# Patient Record
Sex: MALE | Race: WHITE
[De-identification: names, ages, dates, MRNs, and addresses within clinical notes are randomized per-mention and may not be internally consistent; named-entity substitution may affect disease eponyms.]

---

## 2017-01-23 ENCOUNTER — HOSPITAL ENCOUNTER (OUTPATIENT)
Dept: HOSPITAL 62 - END | Age: 57
Discharge: HOME | End: 2017-01-23
Attending: INTERNAL MEDICINE
Payer: MEDICAID

## 2017-01-23 VITALS — DIASTOLIC BLOOD PRESSURE: 73 MMHG | SYSTOLIC BLOOD PRESSURE: 120 MMHG

## 2017-01-23 DIAGNOSIS — Z86.14: ICD-10-CM

## 2017-01-23 DIAGNOSIS — Z79.899: ICD-10-CM

## 2017-01-23 DIAGNOSIS — Z79.51: ICD-10-CM

## 2017-01-23 DIAGNOSIS — R19.4: ICD-10-CM

## 2017-01-23 DIAGNOSIS — K92.1: Primary | ICD-10-CM

## 2017-01-23 DIAGNOSIS — K21.9: ICD-10-CM

## 2017-01-23 DIAGNOSIS — G62.9: ICD-10-CM

## 2017-01-23 DIAGNOSIS — R10.84: ICD-10-CM

## 2017-01-23 DIAGNOSIS — B19.20: ICD-10-CM

## 2017-01-23 DIAGNOSIS — I10: ICD-10-CM

## 2017-01-23 PROCEDURE — 88305 TISSUE EXAM BY PATHOLOGIST: CPT

## 2017-01-23 PROCEDURE — 0DBF8ZX EXCISION OF RIGHT LARGE INTESTINE, VIA NATURAL OR ARTIFICIAL OPENING ENDOSCOPIC, DIAGNOSTIC: ICD-10-PCS | Performed by: INTERNAL MEDICINE

## 2017-01-23 PROCEDURE — 45380 COLONOSCOPY AND BIOPSY: CPT

## 2017-01-23 RX ADMIN — SALINE LAXATIVE ONE ML: 7; 19 ENEMA RECTAL at 11:40

## 2017-01-23 RX ADMIN — SALINE LAXATIVE ONE ML: 7; 19 ENEMA RECTAL at 10:54

## 2017-01-23 NOTE — OPERATIVE REPORT
Operative Report


DATE OF SURGERY: 01/23/17


Operative Report: 


The risks, benefits and alternatives of the procedure including risks of 

bleeding, perforation requiring surgery are explained to the patient in detail 

and informed consent is obtained.  Patient is brought back to the endoscopy 

suite.  Patient is placed in a left lateral decubital position.  Timeout is 

called.  Propofol medication is administered.  A rectal examination was done 

which did not reveal any masses, tears or fissures.  An Olympus video scope was 

inserted into the patient's rectum.  Keeping the lumen in site at all times the 

scope was then gradually advanced all the way to the cecum.  The cecum as 

identified by the ileocecal valve.  Prep is better.  Irrigation had to be done.

  Several liters of stool had to be suctioned out.  The scope was then 

sequentially pulled back out via the various segments of the colon including 

the ascending colon, hepatic flexure, transverse colon, splenic flexure, 

descending colon and finally into the rectosigmoid portions of the colon.  

Retroflexion maneuvers performed.


PREOPERATIVE DIAGNOSIS: Patient complaining of diarrhea.  He does have chronic 

use of long-term narcotic medications.  Rule out colitis.


POSTOPERATIVE DIAGNOSIS: Likely had paradoxical diarrhea.  Biopsies obtained in 

the right side of the colon to rule out for collagenous, microcytic, 

lymphocytic colitis.


OPERATION: Colonoscopy with biopsy


SURGEON: HEIDI CHANCE


ANESTHESIA: LMAC


TISSUE REMOVED OR ALTERED: Right-sided colon biopsies obtained


COMPLICATIONS: 


None.


ESTIMATED BLOOD LOSS: none.


INTRAOPERATIVE FINDINGS: Prep is not adequate.  Large amounts of fluid had to 

be used to irrigate the colon.  No obstruction is noted.  Patient does have 

chronic oncotic pain use.  Likely having paradoxical diarrhea.  No other masses

, AVMs, diverticulosis noted.  Internal hemorrhoids are noted.


PROCEDURE: 


Patient tolerated the procedure well.


No immediate postprocedure complications are noted.


Patient is discharged in good condition.


Discharge date 01/23/2017.


Discharge diet: Regular.


Discharge activity: Regular.


Patient does have a 2-3 week follow-up to discuss findings.


We'll await on biopsies.


Patient is instructed to call the office or proceed to the emergency room 

should there be any further problems or questions.

## 2018-03-14 ENCOUNTER — HOSPITAL ENCOUNTER (OUTPATIENT)
Dept: HOSPITAL 62 - OD | Age: 58
End: 2018-03-14
Attending: FAMILY MEDICINE
Payer: MEDICARE

## 2018-03-14 DIAGNOSIS — M51.37: Primary | ICD-10-CM

## 2018-03-14 DIAGNOSIS — M54.6: ICD-10-CM

## 2018-03-14 PROCEDURE — 72070 X-RAY EXAM THORAC SPINE 2VWS: CPT

## 2018-03-14 PROCEDURE — 72110 X-RAY EXAM L-2 SPINE 4/>VWS: CPT

## 2018-03-14 NOTE — RADIOLOGY REPORT (SQ)
EXAM DESCRIPTION:  T SPINE AP/LAT



COMPLETED DATE/TIME:  3/14/2018 5:10 pm



REASON FOR STUDY:  PAIN IN THORACIC SPINE M51.37  OTHER INTERVERTEBRAL DISC DEGENERATION, LUMBOSACRAL
 R M54.6  PAIN IN THORACIC SPINE



COMPARISON:  None.



NUMBER OF VIEWS:  Two views.



TECHNIQUE:  AP and lateral radiographic images acquired of the thoracic spine.



LIMITATIONS:  None.



FINDINGS:  MINERALIZATION: Normal.

ALIGNMENT: Normal.  No scoliosis.

VERTEBRAE: No fracture or bone lesion.  Maintained height, normal segmentation.

DISCS: No significant loss of height or significant narrowing.  No large osteophytes.

HARDWARE: None in the spine.

MEDIASTINUM AND SOFT TISSUES: Normal heart size and aortic contour.  No soft tissue abnormality.

VISUALIZED LUNG FIELDS: Clear.

OTHER: No other significant finding.



IMPRESSION:  NO SIGNIFICANT RADIOGRAPHIC FINDING IN THE THORACIC SPINE.



TECHNICAL DOCUMENTATION:  JOB ID:  [id number]

 2011 [employer]- All Rights Reserved



Reading location - IP/workstation name: [email or web address]

## 2018-03-14 NOTE — RADIOLOGY REPORT (SQ)
EXAM DESCRIPTION:  LUMBAR SPINE COMPLETE



COMPLETED DATE/TIME:  3/14/2018 5:10 pm



REASON FOR STUDY:  OTHER INTERVERTEBRAL DISC DEGENERATION, LUMBOSACRAL REGION M51.37  OTHER INTERVERT
EBRAL DISC DEGENERATION, LUMBOSACRAL R M54.6  PAIN IN THORACIC SPINE



COMPARISON:  3/19/2012.



NUMBER OF VIEWS:  Five views including obliques.



TECHNIQUE:  AP, lateral, oblique, and sacral radiographic images acquired of the lumbar spine.



LIMITATIONS:  None.



FINDINGS:  MINERALIZATION: Normal.

SEGMENTATION: Normal.  No transitional anatomy.

ALIGNMENT: Normal.

VERTEBRAE: Maintained height.  No fracture or worrisome bone lesion.

DISCS: Multilevel disc space narrowing with osteophytes.

POSTERIOR ELEMENTS: Pedicles and facets are intact.  Previous laminectomy. Facet arthropathy is prese
nt.

HARDWARE: None in the spine.

PARASPINAL SOFT TISSUES: Normal.

PELVIS: Intact as visualized. No fractures or worrisome bone lesions. SI joints intact.

OTHER: No other significant finding.



IMPRESSION:  MULTILEVEL DEGENERATIVE CHANGES.  SURGICAL CHANGES.  NO ACUTE FINDINGS.



TECHNICAL DOCUMENTATION:  JOB ID:  [id number]

 2011 [employer]- All Rights Reserved



Reading location - IP/workstation name: [email or web address]

## 2018-08-18 ENCOUNTER — HOSPITAL ENCOUNTER (EMERGENCY)
Dept: HOSPITAL 62 - ER | Age: 58
Discharge: ANOTHER HOSPITAL | End: 2018-08-18
Payer: MEDICARE

## 2018-08-18 VITALS — SYSTOLIC BLOOD PRESSURE: 113 MMHG | DIASTOLIC BLOOD PRESSURE: 75 MMHG

## 2018-08-18 DIAGNOSIS — R19.7: ICD-10-CM

## 2018-08-18 DIAGNOSIS — Z88.8: ICD-10-CM

## 2018-08-18 DIAGNOSIS — A41.9: Primary | ICD-10-CM

## 2018-08-18 DIAGNOSIS — G89.29: ICD-10-CM

## 2018-08-18 DIAGNOSIS — K92.2: ICD-10-CM

## 2018-08-18 DIAGNOSIS — R74.8: ICD-10-CM

## 2018-08-18 DIAGNOSIS — F17.200: ICD-10-CM

## 2018-08-18 DIAGNOSIS — Z79.891: ICD-10-CM

## 2018-08-18 DIAGNOSIS — N18.3: ICD-10-CM

## 2018-08-18 DIAGNOSIS — I12.9: ICD-10-CM

## 2018-08-18 DIAGNOSIS — R11.2: ICD-10-CM

## 2018-08-18 DIAGNOSIS — Z79.899: ICD-10-CM

## 2018-08-18 DIAGNOSIS — K52.9: ICD-10-CM

## 2018-08-18 DIAGNOSIS — R07.9: ICD-10-CM

## 2018-08-18 DIAGNOSIS — N17.9: ICD-10-CM

## 2018-08-18 DIAGNOSIS — E87.5: ICD-10-CM

## 2018-08-18 LAB
ABSOLUTE LYMPHOCYTES# (MANUAL): 2.1 10^3/UL (ref 0.5–4.7)
ABSOLUTE MONOCYTES # (MANUAL): 1 10^3/UL (ref 0.1–1.4)
ABSOLUTE NEUTROPHILS# (MANUAL): 22.8 10^3/UL (ref 1.7–8.2)
ADD MANUAL DIFF: YES
ALBUMIN SERPL-MCNC: 3.4 G/DL (ref 3.5–5)
ALP SERPL-CCNC: 567 U/L (ref 38–126)
ALT SERPL-CCNC: 45 U/L (ref 21–72)
ANION GAP SERPL CALC-SCNC: 15 MMOL/L (ref 5–19)
APPEARANCE UR: (no result)
APTT PPP: (no result) S
AST SERPL-CCNC: 76 U/L (ref 17–59)
BARBITURATES UR QL SCN: NEGATIVE
BASE EXCESS BLDV CALC-SCNC: -7.9 MMOL/L
BASOPHILS NFR BLD MANUAL: 0 % (ref 0–2)
BILIRUB DIRECT SERPL-MCNC: 0.8 MG/DL (ref 0–0.4)
BILIRUB SERPL-MCNC: 0.8 MG/DL (ref 0.2–1.3)
BILIRUB UR QL STRIP: NEGATIVE
BUN SERPL-MCNC: 61 MG/DL (ref 7–20)
CALCIUM: 9 MG/DL (ref 8.4–10.2)
CHLORIDE SERPL-SCNC: 106 MMOL/L (ref 98–107)
CO2 SERPL-SCNC: 16 MMOL/L (ref 22–30)
CRP SERPL-MCNC: 54.1 MG/L (ref ?–10)
EOSINOPHIL NFR BLD MANUAL: 0 % (ref 0–6)
ERYTHROCYTE [DISTWIDTH] IN BLOOD BY AUTOMATED COUNT: 14.3 % (ref 11.5–14)
ERYTHROCYTE [SEDIMENTATION RATE] IN BLOOD: 9 MM/HR (ref 0–20)
GLUCOSE SERPL-MCNC: 121 MG/DL (ref 75–110)
GLUCOSE UR STRIP-MCNC: NEGATIVE MG/DL
HCO3 BLDV-SCNC: 18 MMOL/L (ref 20–32)
HCT VFR BLD CALC: 51.1 % (ref 37.9–51)
HGB BLD-MCNC: 17 G/DL (ref 13.5–17)
INR PPP: 1.11
KETONES UR STRIP-MCNC: NEGATIVE MG/DL
LIPASE SERPL-CCNC: 82.6 U/L (ref 23–300)
MCH RBC QN AUTO: 30.6 PG (ref 27–33.4)
MCHC RBC AUTO-ENTMCNC: 33.4 G/DL (ref 32–36)
MCV RBC AUTO: 92 FL (ref 80–97)
METHADONE UR QL SCN: NEGATIVE
MONOCYTES % (MANUAL): 4 % (ref 3–13)
NITRITE UR QL STRIP: NEGATIVE
PCO2 BLDV: 38.9 MMHG (ref 35–63)
PCP UR QL SCN: NEGATIVE
PH BLDV: 7.28 [PH] (ref 7.3–7.42)
PH UR STRIP: 5 [PH] (ref 5–9)
PLATELET # BLD: 165 10^3/UL (ref 150–450)
PLATELET COMMENT: ADEQUATE
POTASSIUM SERPL-SCNC: 5.8 MMOL/L (ref 3.6–5)
PROT SERPL-MCNC: 6.5 G/DL (ref 6.3–8.2)
PROT UR STRIP-MCNC: 30 MG/DL
PROTHROMBIN TIME: 14.9 SEC (ref 11.4–15.4)
RBC # BLD AUTO: 5.57 10^6/UL (ref 4.35–5.55)
RBC MORPH BLD: (no result)
SEGMENTED NEUTROPHILS % (MAN): 88 % (ref 42–78)
SODIUM SERPL-SCNC: 137.2 MMOL/L (ref 137–145)
SP GR UR STRIP: 1.01
TOTAL CELLS COUNTED BLD: 100
URINE AMPHETAMINES SCREEN: NEGATIVE
URINE BENZODIAZEPINES SCREEN: (no result)
URINE COCAINE SCREEN: NEGATIVE
URINE MARIJUANA (THC) SCREEN: NEGATIVE
UROBILINOGEN UR-MCNC: 2 MG/DL (ref ?–2)
VARIANT LYMPHS NFR BLD MANUAL: 7 % (ref 13–45)
WBC # BLD AUTO: 25.9 10^3/UL (ref 4–10.5)

## 2018-08-18 PROCEDURE — 84484 ASSAY OF TROPONIN QUANT: CPT

## 2018-08-18 PROCEDURE — 96375 TX/PRO/DX INJ NEW DRUG ADDON: CPT

## 2018-08-18 PROCEDURE — 94640 AIRWAY INHALATION TREATMENT: CPT

## 2018-08-18 PROCEDURE — 80053 COMPREHEN METABOLIC PANEL: CPT

## 2018-08-18 PROCEDURE — 87086 URINE CULTURE/COLONY COUNT: CPT

## 2018-08-18 PROCEDURE — 93005 ELECTROCARDIOGRAM TRACING: CPT

## 2018-08-18 PROCEDURE — 85610 PROTHROMBIN TIME: CPT

## 2018-08-18 PROCEDURE — 96361 HYDRATE IV INFUSION ADD-ON: CPT

## 2018-08-18 PROCEDURE — 96365 THER/PROPH/DIAG IV INF INIT: CPT

## 2018-08-18 PROCEDURE — 85025 COMPLETE CBC W/AUTO DIFF WBC: CPT

## 2018-08-18 PROCEDURE — 81001 URINALYSIS AUTO W/SCOPE: CPT

## 2018-08-18 PROCEDURE — 82803 BLOOD GASES ANY COMBINATION: CPT

## 2018-08-18 PROCEDURE — 99291 CRITICAL CARE FIRST HOUR: CPT

## 2018-08-18 PROCEDURE — 96367 TX/PROPH/DG ADDL SEQ IV INF: CPT

## 2018-08-18 PROCEDURE — 85652 RBC SED RATE AUTOMATED: CPT

## 2018-08-18 PROCEDURE — 76705 ECHO EXAM OF ABDOMEN: CPT

## 2018-08-18 PROCEDURE — 99292 CRITICAL CARE ADDL 30 MIN: CPT

## 2018-08-18 PROCEDURE — 93010 ELECTROCARDIOGRAM REPORT: CPT

## 2018-08-18 PROCEDURE — 87040 BLOOD CULTURE FOR BACTERIA: CPT

## 2018-08-18 PROCEDURE — 87493 C DIFF AMPLIFIED PROBE: CPT

## 2018-08-18 PROCEDURE — 80307 DRUG TEST PRSMV CHEM ANLYZR: CPT

## 2018-08-18 PROCEDURE — 74176 CT ABD & PELVIS W/O CONTRAST: CPT

## 2018-08-18 PROCEDURE — 36415 COLL VENOUS BLD VENIPUNCTURE: CPT

## 2018-08-18 PROCEDURE — 83690 ASSAY OF LIPASE: CPT

## 2018-08-18 PROCEDURE — 82272 OCCULT BLD FECES 1-3 TESTS: CPT

## 2018-08-18 PROCEDURE — 86140 C-REACTIVE PROTEIN: CPT

## 2018-08-18 PROCEDURE — 87088 URINE BACTERIA CULTURE: CPT

## 2018-08-18 PROCEDURE — 83605 ASSAY OF LACTIC ACID: CPT

## 2018-08-18 PROCEDURE — 71045 X-RAY EXAM CHEST 1 VIEW: CPT

## 2018-08-18 PROCEDURE — 94660 CPAP INITIATION&MGMT: CPT

## 2018-08-18 PROCEDURE — 87186 SC STD MICRODIL/AGAR DIL: CPT

## 2018-08-18 PROCEDURE — 82962 GLUCOSE BLOOD TEST: CPT

## 2018-08-18 RX ADMIN — SODIUM CHLORIDE PRN MLS/HR: 9 INJECTION, SOLUTION INTRAVENOUS at 15:10

## 2018-08-18 RX ADMIN — SODIUM CHLORIDE PRN MLS/HR: 9 INJECTION, SOLUTION INTRAVENOUS at 15:31

## 2018-08-18 NOTE — RADIOLOGY REPORT (SQ)
EXAM DESCRIPTION:  U/S ABDOMEN LIMITED W/O DOP



COMPLETED DATE/TIME:  8/18/2018 7:44 pm



REASON FOR STUDY:  elevated LFTs



COMPARISON:  CT abdomen and pelvis 8/18/2018



TECHNIQUE:  Dynamic and static grayscale images acquired of the abdomen and recorded on PACS. Rhodao
cyndi selected color Doppler and spectral images recorded.



LIMITATIONS:  The pancreas and aorta are not adequately visualized.



FINDINGS:  PANCREAS: Not visualized.

LIVER: No masses. Echotexture normal.

LIVER VASCULATURE: Normal directional flow of the main portal vein and hepatic veins.

GALLBLADDER: No stones. Normal wall thickness. No pericholecystic fluid.

ULTRASOUND-DETECTED ARAUZ'S SIGN: Not documented.

INTRAHEPATIC DUCTS AND COMMON DUCT: CBD and intrahepatic ducts normal caliber. No filling defects.

INFERIOR VENA CAVA: Normal flow.

AORTA: Not adequately evaluated.

RIGHT KIDNEY:  Normal size. Normal echogenicity. No solid or suspicious masses. No hydronephrosis. No
 calcifications.

PERITONEAL AND RIGHT PLEURAL SPACE: No ascites or effusions.

OTHER: No other significant findings.



IMPRESSION:  Limited examination.  The pancreas and aorta are not adequately evaluated.  Otherwise un
remarkable sonographic appearance of the right upper quadrant.



TECHNICAL DOCUMENTATION:  JOB ID:  [id number]

 2011 [employer]- All Rights Reserved



Reading location - IP/workstation name: [email or web address]

## 2018-08-18 NOTE — ER DOCUMENT REPORT
ED Medical Screen (RME)





- General


Stated Complaint: CHEST PAIN


Time Seen by Provider: 08/18/18 15:00





- HPI


Notes: 





08/18/18 15:13


Patient brought in by EMS for nausea vomiting diarrhea hours.  Patient states 

lower abdominal pain patient upon arrival here to the ER tachycardic 

hypotensive.





- Related Data


Allergies/Adverse Reactions: 


 





ketorolac [From Toradol] Adverse Reaction (Verified 07/29/17 22:53)


 











Review of Systems





- Review of Systems


Respiratory: Stridor


Gastrointestinal: Abdominal pain, Diarrhea, Nausea





Physical Exam





- Cardiovascular


Rhythm: Regular, Tachycardia


Heart sounds: Normal auscultation





- Abdominal


Tenderness: Other - Soft





Course





- Re-evaluation


Re-evalutation: 





08/18/18 15:14


EKG was ordered showing sinus tachycardia.  2 large-bore IVs have been 

established with fluid instilling patient also has an IO in the right lower 

extremity.  Patient on a nonrebreather.  Patient's saturations at this time 92%

.  Patient lungs are otherwise clear.  Concern for hypovolemia.  Also according 

to EMS initially concerned for a STEMI However EKGs were faxed to [facility] and reviewed by the STEMI team there deemed not a STEMI.  EKG at this time 

shows possible slight elevation in V6 however there is no signs of changes in 

contiguous leads.  EKG does show sinus tachycardia.  Because of low O2 sat on 

100% we will order BiPAP for the patient.  I discussed the initial presentation 

of the patient with colleague Elizabeth who will eval the pt





- Laboratory


Result Diagrams: 


 08/18/18 14:55





 08/18/18 14:55

## 2018-08-18 NOTE — RADIOLOGY REPORT (SQ)
EXAM DESCRIPTION:  CT ABD/PELVIS NO ORAL OR IV



COMPLETED DATE/TIME:  8/18/2018 6:04 pm



REASON FOR STUDY:  vomiting, diarrhea, bloody stool



COMPARISON:  None.



TECHNIQUE:  CT scan of the abdomen and pelvis performed without intravenous or oral contrast. Images 
reviewed with lung, soft tissue, and bone windows. Reconstructed coronal and sagittal MPR images revi
ewed. All images stored on PACS.

All CT scanners at this facility use dose modulation, iterative reconstruction, and/or weight based d
osing when appropriate to reduce radiation dose to as low as reasonably achievable (ALARA).

[facility]: Dose Right  [facility]: CareDose    [facility]: Dose Right    [facility]: Teradose 4D    [facility]: Smart [employer]



RADIATION DOSE:  CT Rad equipment meets quality standard of care and radiation dose reduction techniq
ues were employed. CTDIvol: 11.3 mGy. DLP: 643 mGy-cm.mGy.



LIMITATIONS:  The dome of the right lobe of the liver is not included in the imaged field of view.



FINDINGS:  LOWER CHEST: No significant findings. No nodules or infiltrates.

NON-CONTRASTED LIVER, SPLEEN, ADRENALS: Evaluation limited by lack of IV contrast and truncated field
 of view excluding the dome of the right hepatic lobe.  Incidental note is made of scattered hepatic 
cysts.  No identified significant masses.

PANCREAS: No masses. No peripancreatic inflammatory changes.

GALLBLADDER: Hydropic.  No identified stones by CT criteria. No inflammatory changes to suggest julio césar
cystitis.

RIGHT KIDNEY AND URETER: No suspicious masses. Assessment limited by lack of IV contrast.   No signif
icant calcifications.   No hydronephrosis or hydroureter.

LEFT KIDNEY AND URETER: No suspicious masses. Assessment limited by lack of IV contrast.   No signifi
cant calcifications.   No hydronephrosis or hydroureter.

AORTA AND RETROPERITONEUM: No aneurysm. No retroperitoneal masses or adenopathy.

BOWEL AND PERITONEAL CAVITY: Circumferential mural thickening is seen of the transverse and descendin
g colon with adjacent mesenteric fat stranding.  The remaining bowel appears grossly unremarkable.

APPENDIX: Not visualized.

PELVIS, BLADDER, AND ABDOMINAL WALL:No abnormal masses. No free fluid. Bladder normal.

BONES: No significant findings.

OTHER: Severe degenerative changes are seen of the hips and spine.



IMPRESSION:  Inflammatory changes involving the transverse and descending colon, likely on the basis 
of long segment colitis.



COMMENT:  Quality ID # 436: Final reports with documentation of one or more dose reduction techniques
 (e.g., Automated exposure control, adjustment of the mA and/or kV according to patient size, use of 
iterative reconstruction technique)



TECHNICAL DOCUMENTATION:  JOB ID:  [id number]

 2011 [employer]- All Rights Reserved



Reading location - IP/workstation name: [email or web address]

## 2018-08-18 NOTE — EKG REPORT
SEVERITY:- BORDERLINE ECG -

SINUS TACHYCARDIA

LOW VOLTAGE WITH RIGHT AXIS DEVIATION

BORDERLINE ST ELEVATION, INFERIOR LEADS

:

Confirmed by: Luis M Connors MD 18-Aug-2018 17:05:35

## 2018-08-18 NOTE — RADIOLOGY REPORT (SQ)
EXAM DESCRIPTION:  CHEST SINGLE VIEW



COMPLETED DATE/TIME:  8/18/2018 3:46 pm



REASON FOR STUDY:  tachy hypotensive



COMPARISON:  None.



EXAM PARAMETERS:  NUMBER OF VIEWS: One view.

TECHNIQUE: Single frontal radiographic view of the chest acquired.

RADIATION DOSE: NA

LIMITATIONS: None.



FINDINGS:  LUNGS AND PLEURA: No focal consolidation, masses or pneumothorax.  Incidental note is made
 of a partially calcified granuloma at the left lung base.  No pleural effusion.

MEDIASTINUM AND HILAR STRUCTURES: No masses.  Contour normal.

HEART AND VASCULAR STRUCTURES: Heart normal in size.  Normal vasculature.

BONES: No acute findings.

HARDWARE: None in the chest.

OTHER: No other significant finding.



IMPRESSION:  NO ACUTE RADIOGRAPHIC FINDING IN THE CHEST.



TECHNICAL DOCUMENTATION:  JOB ID:  [id number]

 2011 [employer]- All Rights Reserved



Reading location - IP/workstation name: [email or web address]

## 2018-08-18 NOTE — EKG REPORT
SEVERITY:- ABNORMAL ECG -

SINUS TACHYCARDIA

LOW VOLTAGE WITH RIGHT AXIS DEVIATION

ST ELEVATION SUGGESTS PERICARDITIS

:

Confirmed by: Luis M Connors MD 18-Aug-2018 17:06:40

## 2018-08-18 NOTE — ER DOCUMENT REPORT
ED General





- General


Mode of Arrival: Medic


Information source: Patient





<BILL PAGE - Last Filed: 08/18/18 21:30>





<ELIZABETHSOHEILA - Last Filed: 08/18/18 21:32>





- General


Chief Complaint: Nausea/Vomiting/Diarrhea


Stated Complaint: CHEST PAIN


Time Seen by Provider: 08/18/18 15:00


Notes: 


Patient is a 58 year old male on chronic pain management presents to the 

emergency department via EMS complaining of nausea and vomiting for 2-3 days 

with an associated symptom of lower abdominal pain. Patient states he was 

recently diagnosed with bronchitis and subsequently prescribed 2 antibiotics 

and steroids. He states he presented to the emergency department today when he 

began to have difficulty breathing and diffuse chest pain. He states he has had 

some blood in his stool further stating he was previously constipated and 

believes the bleeding is from tearing with exertion during his bowel movements. 

He denies any hematemesis. 





Patient presented to the emergency department hypotensive, hypoxic and 

tachycardic with an IO in the right lower extremity. EMS was initally concerned 

for a STEMI although EKG were faxed to [facility] and reviewed by a STEMI 

team who determined the patient was not a STEMI. 











 (BILL PAGE)





- Related Data


Allergies/Adverse Reactions: 


 





ketorolac [From Toradol] Adverse Reaction (Verified 07/29/17 22:53)


 











Past Medical History





- General


Information source: Patient





- Social History


Smoking Status: Current Every Day Smoker


Chew tobacco use (# tins/day): No


Frequency of alcohol use: Heavy


Drug Abuse: None


Family History: Reviewed & Not Pertinent


Patient has suicidal ideation: No


Patient has homicidal ideation: No





- Past Medical History


Cardiac Medical History: Reports: Hx Hypertension


Past Surgical History: Reports: Hx Orthopedic Surgery





<BILL PAGE - Last Filed: 08/18/18 21:30>





Review of Systems





- Review of Systems


Constitutional: No symptoms reported


EENT: No symptoms reported


Cardiovascular: See HPI, Chest pain


Respiratory: See HPI


Gastrointestinal: See HPI, Diarrhea, Nausea, Vomiting


Genitourinary: No symptoms reported


Male Genitourinary: No symptoms reported


Musculoskeletal: No symptoms reported


Skin: No symptoms reported


Hematologic/Lymphatic: No symptoms reported


Neurological/Psychological: No symptoms reported


-: Yes All other systems reviewed and negative





<BILL PAGE - Last Filed: 08/18/18 21:30>





Physical Exam





<BILL PAGE - Last Filed: 08/18/18 21:30>





<SOHEILA HENSON - Last Filed: 08/18/18 21:32>





- Vital signs


Vitals: 


 











Resp BP Pulse Ox


 


 33 H  83/57 L  92 


 


 08/18/18 14:55  08/18/18 14:55  08/18/18 14:55














- Notes


Notes: 


GENERAL: Awake, appears uncomfortable, ill appearing. No acute distress.


HEAD: Normocephalic, atraumatic.


EYES: Pupils equal, round, and reactive to light. Extraocular movements intact.


ENT: Oral mucosa dry, tongue midline.


NECK: Full range of motion. Supple. Trachea midline.


LUNGS: Rhonchi bilaterally, on a non-rebreather at bedside.


HEART: Tachycardic. No murmurs, gallops, or rubs.


ABDOMEN: Soft, tender to palpation to RUQ, LUQ, RLL, LLQ, non tender to the 

epigastrium. Non-distended. Bowel sounds present in all 4 quadrants.


EXTREMITIES: Moves all 4 extremities spontaneously. No edema, radial pulses 2/4 

bilaterally,decreased dorsalis pedis bilaterally . No cyanosis.


NEUROLOGICAL: Alert and oriented x3. Normal speech. 


PSYCH: Normal affect, normal mood.


SKIN: Core hot touch, extremities cool to touch, dry, normal turgor. No rashes 

or lesions noted.





 (BILL PAGE)





Course





- Laboratory


Result Diagrams: 


 08/18/18 17:00





 08/18/18 17:00





<BILL PAGE - Last Filed: 08/18/18 21:30>





- Laboratory


Result Diagrams: 


 08/18/18 17:00





 08/18/18 17:00





<SOHEILA HENSON - Last Filed: 08/18/18 21:32>





- Re-evaluation


Re-evalutation: 





08/18/18 21:23


Patient initially showed up was hypotensive and tachycardic but afebrile.  He 

was also tachypneic and hypoxic.  Full septic workup was initiated, patient was 

placed on BiPAP and his hypoxia responded well to this.  Venous blood gas 

showed acidosis with pH of 7.28, no evidence of CO2 retention, he was given an 

albuterol treatment and he is now breathing much more easily, patient was able 

to be taken off of the BiPAP and is maintaining his saturations at 95% on 4 L 

via nasal cannula.  When removed from BiPAP he became less hypotensive.  

Patient was given a 3 L bolus of crystalloids, initially given 2 L normal 

saline and then a 3 L of lactated Ringer's.  Patient is now receiving lactated 

Ringer's at 250 mL's per hour.  Lactic acid markedly elevated at 5.1, Zosyn 

started for sepsis, patient ended up having a grossly bloody bowel movement here

, this is different than his initial report however he then told the nurse that 

he had been having grossly bloody diarrhea.  CBC shows leukocytosis of 25.9, 

there is no anemia, hemoglobin 17, platelets normal, INR slightly prolonged at 

1.11, chemistries show acute on chronic renal failure with a BUN of 61 and a 

creatinine of 3.1, potassium elevated at 5.8, this is treated with albuterol, 

calcium gluconate and bicarbonate as well as hydration, AST was elevated 76, 

alkaline phosphatase elevated at 567, troponin elevated at 5.82.  ESR and CRP 

had been ordered because initial EKG raised the possibility of pericarditis 

however I think this is motion artifact mostly, ESR is normal, CRP is elevated 

at 54.1, lipase normal, urinalysis also so signs of blood and infection, there 

is large leukocyte esterase and 2+ bacteria.  Urine drug screen shows opiates 

and benzos both which she is prescribed.  Repeat lactic acid is improving at 2.5

, C. difficile PCR is pending.  Chest x-ray shows no acute process.  CT scan of 

the abdomen and pelvis reveals circumferential mural thickening of the 

transverse and descending colon with adjacent mesenteric fat stranding, 

appendix is not visualized, gallbladder is hydropic.  Flagyl was added based 

off of the findings of colitis, I did then go ahead and discussed this patient 

with the internist Dr. Bernstein from [facility], 

based off of the LFTs she requested that we add a right upper quadrant 

ultrasound.  This did not show any signs of cholecystitis or obstruction.





Patient has been rechecked multiple times approximately every hour and he is 

improving.  See sepsis recheck note for exact details.





Dr. Lawson Meeks did accept the patient to her service at [facility].  Patient is being transferred because he has an ongoing lower 

GI bleed and we do not have GI on call, he has an elevated troponin consistent 

with a non-STEMI and we will not have access to cardiac catheterization for a 

another 3 days, he is acute renal failure we do not have nephrology on-call.


08/18/18 21:32


Small dose of Ativan given due to his history of heavy alcohol use and the fact 

that he has been having vomiting for several days, some of his tachycardia may 

be coming from withdrawal as it is persistent despite normalization of his 

blood pressure.  Aspirin was withheld as he is having a GI bleed and is 

allergic to ketorolac. (SOHEILA HENSON)





- Vital Signs


Vital signs: 


 











Temp Pulse Resp BP Pulse Ox


 


 98.5 F      22 H  106/94 H  96 


 


 08/18/18 15:44     08/18/18 21:11  08/18/18 21:11  08/18/18 21:11














- Laboratory


Laboratory results interpreted by me: 


 











  08/18/18 08/18/18 08/18/18





  14:55 14:55 15:33


 


WBC   


 


RBC   


 


Hct   


 


RDW   


 


Seg Neuts % (Manual)   


 


Lymphocytes % (Manual)   


 


Abs Neuts (Manual)   


 


VBG pH   7.28 L 


 


VBG HCO3   18.0 L 


 


Potassium   


 


Carbon Dioxide   


 


BUN   


 


Creatinine   


 


Est GFR ( Amer)   


 


Est GFR (Non-Af Amer)   


 


Glucose   


 


POC Glucose    137 H


 


Lactic Acid  5.1 H  


 


Direct Bilirubin   


 


AST   


 


Alkaline Phosphatase   


 


C-Reactive Protein   


 


Albumin   


 


Urine Protein   


 


Urine Blood   


 


Urine Urobilinogen   


 


Ur Leukocyte Esterase   














  08/18/18 08/18/18 08/18/18





  17:00 17:00 17:28


 


WBC  25.9 H  


 


RBC  5.57 H  


 


Hct  51.1 H  


 


RDW  14.3 H  


 


Seg Neuts % (Manual)  88 H  


 


Lymphocytes % (Manual)  7 L  


 


Abs Neuts (Manual)  22.8 H  


 


VBG pH   


 


VBG HCO3   


 


Potassium   5.8 H 


 


Carbon Dioxide   16 L 


 


BUN   61 H 


 


Creatinine   3.10 H 


 


Est GFR ( Amer)   25 L 


 


Est GFR (Non-Af Amer)   21 L 


 


Glucose   121 H 


 


POC Glucose   


 


Lactic Acid   


 


Direct Bilirubin   0.8 H 


 


AST   76 H 


 


Alkaline Phosphatase   567 H 


 


C-Reactive Protein   54.1 H 


 


Albumin   3.4 L 


 


Urine Protein    30 H


 


Urine Blood    MODERATE H


 


Urine Urobilinogen    2.0 H


 


Ur Leukocyte Esterase    LARGE H














  08/18/18





  18:55


 


WBC 


 


RBC 


 


Hct 


 


RDW 


 


Seg Neuts % (Manual) 


 


Lymphocytes % (Manual) 


 


Abs Neuts (Manual) 


 


VBG pH 


 


VBG HCO3 


 


Potassium 


 


Carbon Dioxide 


 


BUN 


 


Creatinine 


 


Est GFR ( Amer) 


 


Est GFR (Non-Af Amer) 


 


Glucose 


 


POC Glucose 


 


Lactic Acid  2.5 H


 


Direct Bilirubin 


 


AST 


 


Alkaline Phosphatase 


 


C-Reactive Protein 


 


Albumin 


 


Urine Protein 


 


Urine Blood 


 


Urine Urobilinogen 


 


Ur Leukocyte Esterase 














- EKG Interpretation by Me


Additional EKG results interpreted by me: 





08/18/18 21:27


Initial EKG shows sinus tachycardia at a rate of 139, right axis deviation, 

poor R-wave progression, significant baseline artifact, difficult to determine 

ST segment elevation although there is no ST segment depression and there are 

no reciprocal changes.  Possible ST segment elevation in V6 but again so much 

baseline artifact but I cannot tell, similarly there may be some ST segment 

elevation in lead II but there is none in lead III or aVF per my interpretation.

 (SOHEILA HENSON)





Critical Care Note





- Critical Care Note


Total time excluding time spent on procedures (mins): 80





<SOHEILA HENSON - Last Filed: 08/18/18 21:32>





Discharge





<BILL PAGE - Last Filed: 08/18/18 21:30>





<SOHEILA HENSON - Last Filed: 08/18/18 21:32>





- Discharge


Clinical Impression: 


 Hyperkalemia, Lower GI bleed, Colitis





Sepsis


Qualifiers:


 Sepsis type: sepsis due to unspecified organism Qualified Code(s): A41.9 - 

Sepsis, unspecified organism





Acute on chronic renal failure


Qualifiers:


 Acute renal failure type: unspecified Chronic kidney disease stage: stage 3 (

moderate) Qualified Code(s): N17.9 - Acute kidney failure, unspecified





Condition: Critical


Disposition: Critical access hospital


Scribe Attestation: 





08/18/18 21:32


I personally performed the services described in the documentation, reviewed 

and edited the documentation which was dictated to the scribe in my presence, 

and it accurately records my words and actions. (SOHEILA HENSON)





Scribe Documentation





- Scribe


Written by Rin:: Rin Nolasco, 8/18/2018 16:26


acting as scribe for :: Elizabeth





<BILL PAGE - Last Filed: 08/18/18 21:30>





Sepsis





<BILL PAGE - Last Filed: 08/18/18 21:30>





- Sepsis Documentation


Sepsis Patient: Yes





- Vital Signs


Interpretation: Hypotensive, Tachycardic





- Cardiovascular


Peripheral Pulse Strength: Normal


Capillary refill: > 3 seconds


Rhythm: Regular, Tachycardia


Heart Sounds: Normal auscultation





- Respiratory


Breath Sounds: Clear


Respiratory Status: Tachypnea





- Skin


Skin Color: Pale





<SOHEILA HENSON - Last Filed: 08/18/18 21:32>





- Vital Signs


Vitals: 


 











Temp Pulse Resp BP Pulse Ox


 


 98.5 F      22 H  106/94 H  96 


 


 08/18/18 15:44     08/18/18 21:11  08/18/18 21:11  08/18/18 21:11











On recheck at 1544 temperature is 98.5, heart rate is 121, blood pressure was 91

/67, respiratory rate was 17, oxygenation was 100% on nonrebreather (SOHEILA HENSON)

## 2018-10-03 ENCOUNTER — HOSPITAL ENCOUNTER (OUTPATIENT)
Dept: HOSPITAL 62 - OROUT | Age: 58
Discharge: HOME | End: 2018-10-03
Attending: INTERNAL MEDICINE
Payer: MEDICARE

## 2018-10-03 VITALS — DIASTOLIC BLOOD PRESSURE: 81 MMHG | SYSTOLIC BLOOD PRESSURE: 122 MMHG

## 2018-10-03 DIAGNOSIS — F17.210: ICD-10-CM

## 2018-10-03 DIAGNOSIS — R06.02: ICD-10-CM

## 2018-10-03 DIAGNOSIS — K62.5: ICD-10-CM

## 2018-10-03 DIAGNOSIS — Z88.8: ICD-10-CM

## 2018-10-03 DIAGNOSIS — K29.50: ICD-10-CM

## 2018-10-03 DIAGNOSIS — I05.0: ICD-10-CM

## 2018-10-03 DIAGNOSIS — K92.0: Primary | ICD-10-CM

## 2018-10-03 DIAGNOSIS — I10: ICD-10-CM

## 2018-10-03 PROCEDURE — 45378 DIAGNOSTIC COLONOSCOPY: CPT

## 2018-10-03 PROCEDURE — 88305 TISSUE EXAM BY PATHOLOGIST: CPT

## 2018-10-03 PROCEDURE — 88342 IMHCHEM/IMCYTCHM 1ST ANTB: CPT

## 2018-10-03 PROCEDURE — 36415 COLL VENOUS BLD VENIPUNCTURE: CPT

## 2018-10-03 PROCEDURE — G0121 COLON CA SCRN NOT HI RSK IND: HCPCS

## 2018-10-03 PROCEDURE — 43239 EGD BIOPSY SINGLE/MULTIPLE: CPT

## 2018-10-03 PROCEDURE — 84132 ASSAY OF SERUM POTASSIUM: CPT

## 2018-10-03 NOTE — OPERATIVE REPORT
Operative Report


DATE OF SURGERY: 10/03/18


Operative Report: 





The risks, benefits and alternatives of the procedure including the risks of 

bleeding, perforation requiring surgery are explained to the patient in detail 

and informed consent is obtained.  Patient is brought back to the operating 

room and placed in the left, lateral decubital position.  An Olympus videoscope 

was introduced into the patient's mouth.  The esophagus is identified and 

ablated and insufflated.  The scope was then gradually advanced all which was 

the distal esophagus.  There does appear to be a previous old scar suggestive 

of a previous Salina-Hobson tear.  The scope is then advanced through the 

stomach mild gastritis is noted.  First and second portions of the duodenum 

were normal.


Following this the patient structures done around and colonoscopy attempted.  

When the scope was introduced into the patient's rectum there was solid stool.  

No good visualization was able to be obtained.  I aborted the procedure at risk 

of perforation.  Patient will need to be reprepped and rescheduled for 

colonoscopy.


PREOPERATIVE DIAGNOSIS: Blood in stool, diarrhea, change of bowel habits.  

Hematemesis


POSTOPERATIVE DIAGNOSIS: Gastritis status post biopsy.  Flexible sigmoidoscopy

diagnostic, incomplete colonoscopy due to the prep.


OPERATION: Diagnostic flex sig.  EGD with biopsy


SURGEON: HEIDI CHANCE


ANESTHESIA: LMAC


TISSUE REMOVED OR ALTERED: As noted above.


COMPLICATIONS: 





None.


ESTIMATED BLOOD LOSS: None.


INTRAOPERATIVE FINDINGS: As noted above.


PROCEDURE: 





Patient tolerated the procedure well.


No immediate postprocedure complications are noted.


Patient to be discharged in good condition.


Discharge date 10/3/2018.


Discharge diet: Regular.


Discharge activity: Regular.


Not able to complete the colonoscopy due to inadequate prep.  Proceeding with 

the procedure with the put the patient at high risk of potential perforation 

and therefore it was aborted for patient safety.


He will need to go ahead and reprep


We will go ahead and reschedule him for another location


Wait on gastric biopsies


He appears to have had at least a Salina-Hobson tear in the past


This is almost healed


No blood noted in the last 30 cm of the colon.

## 2019-01-18 ENCOUNTER — HOSPITAL ENCOUNTER (OUTPATIENT)
Dept: HOSPITAL 62 - OD | Age: 59
End: 2019-01-18
Attending: FAMILY MEDICINE
Payer: MEDICARE

## 2019-01-18 DIAGNOSIS — F51.01: ICD-10-CM

## 2019-01-18 DIAGNOSIS — F41.1: Primary | ICD-10-CM

## 2019-01-18 DIAGNOSIS — M47.814: ICD-10-CM

## 2019-01-18 DIAGNOSIS — M51.37: ICD-10-CM

## 2019-01-18 DIAGNOSIS — I10: ICD-10-CM

## 2019-01-18 DIAGNOSIS — M47.812: ICD-10-CM

## 2019-01-18 LAB
ADD MANUAL DIFF: NO
ALBUMIN SERPL-MCNC: 4.8 G/DL (ref 3.5–5)
ALP SERPL-CCNC: 100 U/L (ref 38–126)
ALT SERPL-CCNC: 46 U/L (ref 21–72)
ANION GAP SERPL CALC-SCNC: 11 MMOL/L (ref 5–19)
AST SERPL-CCNC: 41 U/L (ref 17–59)
BASOPHILS # BLD AUTO: 0 10^3/UL (ref 0–0.2)
BASOPHILS NFR BLD AUTO: 0.3 % (ref 0–2)
BILIRUB DIRECT SERPL-MCNC: 0.2 MG/DL (ref 0–0.4)
BILIRUB SERPL-MCNC: 0.4 MG/DL (ref 0.2–1.3)
BUN SERPL-MCNC: 43 MG/DL (ref 7–20)
CALCIUM: 10.6 MG/DL (ref 8.4–10.2)
CHLORIDE SERPL-SCNC: 105 MMOL/L (ref 98–107)
CO2 SERPL-SCNC: 25 MMOL/L (ref 22–30)
EOSINOPHIL # BLD AUTO: 0 10^3/UL (ref 0–0.6)
EOSINOPHIL NFR BLD AUTO: 0 % (ref 0–6)
ERYTHROCYTE [DISTWIDTH] IN BLOOD BY AUTOMATED COUNT: 14.1 % (ref 11.5–14)
GLUCOSE SERPL-MCNC: 105 MG/DL (ref 75–110)
HCT VFR BLD CALC: 43.3 % (ref 37.9–51)
HGB BLD-MCNC: 14.9 G/DL (ref 13.5–17)
LYMPHOCYTES # BLD AUTO: 2.7 10^3/UL (ref 0.5–4.7)
LYMPHOCYTES NFR BLD AUTO: 25.2 % (ref 13–45)
MCH RBC QN AUTO: 29.8 PG (ref 27–33.4)
MCHC RBC AUTO-ENTMCNC: 34.3 G/DL (ref 32–36)
MCV RBC AUTO: 87 FL (ref 80–97)
MONOCYTES # BLD AUTO: 0.5 10^3/UL (ref 0.1–1.4)
MONOCYTES NFR BLD AUTO: 5.1 % (ref 3–13)
NEUTROPHILS # BLD AUTO: 7.4 10^3/UL (ref 1.7–8.2)
NEUTS SEG NFR BLD AUTO: 69.4 % (ref 42–78)
PLATELET # BLD: 263 10^3/UL (ref 150–450)
POTASSIUM SERPL-SCNC: 5.5 MMOL/L (ref 3.6–5)
PROT SERPL-MCNC: 8.1 G/DL (ref 6.3–8.2)
RBC # BLD AUTO: 4.98 10^6/UL (ref 4.35–5.55)
SODIUM SERPL-SCNC: 140.6 MMOL/L (ref 137–145)
TOTAL CELLS COUNTED % (AUTO): 100 %
WBC # BLD AUTO: 10.7 10^3/UL (ref 4–10.5)

## 2019-01-18 PROCEDURE — 80053 COMPREHEN METABOLIC PANEL: CPT

## 2019-01-18 PROCEDURE — 36415 COLL VENOUS BLD VENIPUNCTURE: CPT

## 2019-01-18 PROCEDURE — 85025 COMPLETE CBC W/AUTO DIFF WBC: CPT

## 2019-08-04 ENCOUNTER — HOSPITAL ENCOUNTER (EMERGENCY)
Dept: HOSPITAL 62 - ER | Age: 59
Discharge: HOME | End: 2019-08-04
Payer: MEDICARE

## 2019-08-04 VITALS — DIASTOLIC BLOOD PRESSURE: 82 MMHG | SYSTOLIC BLOOD PRESSURE: 124 MMHG

## 2019-08-04 DIAGNOSIS — I10: ICD-10-CM

## 2019-08-04 DIAGNOSIS — Z88.8: ICD-10-CM

## 2019-08-04 DIAGNOSIS — M79.10: ICD-10-CM

## 2019-08-04 DIAGNOSIS — F17.210: ICD-10-CM

## 2019-08-04 DIAGNOSIS — N30.00: Primary | ICD-10-CM

## 2019-08-04 DIAGNOSIS — G89.29: ICD-10-CM

## 2019-08-04 DIAGNOSIS — J44.9: ICD-10-CM

## 2019-08-04 DIAGNOSIS — Z79.899: ICD-10-CM

## 2019-08-04 DIAGNOSIS — R53.1: ICD-10-CM

## 2019-08-04 LAB
ADD MANUAL DIFF: NO
ALBUMIN SERPL-MCNC: 4.8 G/DL (ref 3.5–5)
ALP SERPL-CCNC: 106 U/L (ref 38–126)
ANION GAP SERPL CALC-SCNC: 12 MMOL/L (ref 5–19)
APPEARANCE UR: (no result)
APTT PPP: YELLOW S
AST SERPL-CCNC: 23 U/L (ref 17–59)
BASOPHILS # BLD AUTO: 0.1 10^3/UL (ref 0–0.2)
BASOPHILS NFR BLD AUTO: 0.7 % (ref 0–2)
BILIRUB DIRECT SERPL-MCNC: 0.6 MG/DL (ref 0–0.4)
BILIRUB SERPL-MCNC: 0.6 MG/DL (ref 0.2–1.3)
BILIRUB UR QL STRIP: NEGATIVE
BUN SERPL-MCNC: 26 MG/DL (ref 7–20)
CALCIUM: 10.3 MG/DL (ref 8.4–10.2)
CHLORIDE SERPL-SCNC: 108 MMOL/L (ref 98–107)
CO2 SERPL-SCNC: 24 MMOL/L (ref 22–30)
EOSINOPHIL # BLD AUTO: 0 10^3/UL (ref 0–0.6)
EOSINOPHIL NFR BLD AUTO: 0.1 % (ref 0–6)
ERYTHROCYTE [DISTWIDTH] IN BLOOD BY AUTOMATED COUNT: 14.6 % (ref 11.5–14)
GLUCOSE SERPL-MCNC: 90 MG/DL (ref 75–110)
GLUCOSE UR STRIP-MCNC: NEGATIVE MG/DL
HCT VFR BLD CALC: 42.6 % (ref 37.9–51)
HGB BLD-MCNC: 14.3 G/DL (ref 13.5–17)
KETONES UR STRIP-MCNC: NEGATIVE MG/DL
LYMPHOCYTES # BLD AUTO: 2.3 10^3/UL (ref 0.5–4.7)
LYMPHOCYTES NFR BLD AUTO: 22.1 % (ref 13–45)
MCH RBC QN AUTO: 29.9 PG (ref 27–33.4)
MCHC RBC AUTO-ENTMCNC: 33.6 G/DL (ref 32–36)
MCV RBC AUTO: 89 FL (ref 80–97)
MONOCYTES # BLD AUTO: 0.5 10^3/UL (ref 0.1–1.4)
MONOCYTES NFR BLD AUTO: 4.7 % (ref 3–13)
NEUTROPHILS # BLD AUTO: 7.4 10^3/UL (ref 1.7–8.2)
NEUTS SEG NFR BLD AUTO: 72.4 % (ref 42–78)
NITRITE UR QL STRIP: NEGATIVE
PH UR STRIP: 5 [PH] (ref 5–9)
PLATELET # BLD: 247 10^3/UL (ref 150–450)
POTASSIUM SERPL-SCNC: 5.6 MMOL/L (ref 3.6–5)
PROT SERPL-MCNC: 8.3 G/DL (ref 6.3–8.2)
PROT UR STRIP-MCNC: 30 MG/DL
RBC # BLD AUTO: 4.78 10^6/UL (ref 4.35–5.55)
SP GR UR STRIP: 1.02
TOTAL CELLS COUNTED % (AUTO): 100 %
UROBILINOGEN UR-MCNC: NEGATIVE MG/DL (ref ?–2)
WBC # BLD AUTO: 10.2 10^3/UL (ref 4–10.5)

## 2019-08-04 PROCEDURE — 83735 ASSAY OF MAGNESIUM: CPT

## 2019-08-04 PROCEDURE — 93010 ELECTROCARDIOGRAM REPORT: CPT

## 2019-08-04 PROCEDURE — 85025 COMPLETE CBC W/AUTO DIFF WBC: CPT

## 2019-08-04 PROCEDURE — 99284 EMERGENCY DEPT VISIT MOD MDM: CPT

## 2019-08-04 PROCEDURE — 81001 URINALYSIS AUTO W/SCOPE: CPT

## 2019-08-04 PROCEDURE — 71045 X-RAY EXAM CHEST 1 VIEW: CPT

## 2019-08-04 PROCEDURE — 93005 ELECTROCARDIOGRAM TRACING: CPT

## 2019-08-04 PROCEDURE — 80053 COMPREHEN METABOLIC PANEL: CPT

## 2019-08-04 PROCEDURE — 36415 COLL VENOUS BLD VENIPUNCTURE: CPT

## 2019-08-04 PROCEDURE — 96360 HYDRATION IV INFUSION INIT: CPT
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
## 2019-08-04 NOTE — RADIOLOGY REPORT (SQ)
EXAM DESCRIPTION:  CHEST SINGLE VIEW



COMPLETED DATE/TIME:  8/4/2019 4:06 pm



REASON FOR STUDY:  weakness



COMPARISON:  8/18/2018.



EXAM PARAMETERS:  NUMBER OF VIEWS: One view.

TECHNIQUE: Single frontal radiographic view of the chest acquired.

RADIATION DOSE: NA

LIMITATIONS: None.



FINDINGS:  LUNGS AND PLEURA: Chronic interstitial changes.  No focal infiltrates, masses or pneumotho
rax. No pleural effusion.

MEDIASTINUM AND HILAR STRUCTURES: No masses.  Contour normal.

HEART AND VASCULAR STRUCTURES: Heart normal in size.  Normal vasculature.

BONES: No acute findings.

HARDWARE: None in the chest.

OTHER: No other significant finding.



IMPRESSION:  NO ACUTE RADIOGRAPHIC FINDING IN THE CHEST.



TECHNICAL DOCUMENTATION:  JOB ID:  [id number]

 2011 [employer]- All Rights Reserved



Reading location - IP/workstation name: [email or web address]

## 2019-08-04 NOTE — EKG REPORT
SEVERITY:- BORDERLINE ECG -

SINUS RHYTHM

LOW VOLTAGE FRONTAL LEADS

:

Confirmed by: Theodora Dixon MD 04-Aug-2019 23:11:08

## 2019-09-07 ENCOUNTER — HOSPITAL ENCOUNTER (EMERGENCY)
Dept: HOSPITAL 62 - ER | Age: 59
Discharge: HOME | End: 2019-09-07
Payer: MEDICARE

## 2019-09-07 VITALS — DIASTOLIC BLOOD PRESSURE: 76 MMHG | SYSTOLIC BLOOD PRESSURE: 119 MMHG

## 2019-09-07 DIAGNOSIS — J44.9: ICD-10-CM

## 2019-09-07 DIAGNOSIS — G89.29: ICD-10-CM

## 2019-09-07 DIAGNOSIS — E86.0: ICD-10-CM

## 2019-09-07 DIAGNOSIS — J32.3: Primary | ICD-10-CM

## 2019-09-07 DIAGNOSIS — F17.200: ICD-10-CM

## 2019-09-07 DIAGNOSIS — Z79.891: ICD-10-CM

## 2019-09-07 DIAGNOSIS — R27.0: ICD-10-CM

## 2019-09-07 DIAGNOSIS — N18.9: ICD-10-CM

## 2019-09-07 DIAGNOSIS — Z99.3: ICD-10-CM

## 2019-09-07 DIAGNOSIS — I12.9: ICD-10-CM

## 2019-09-07 DIAGNOSIS — R51: ICD-10-CM

## 2019-09-07 DIAGNOSIS — Z88.8: ICD-10-CM

## 2019-09-07 LAB
ADD MANUAL DIFF: NO
ALBUMIN SERPL-MCNC: 3.9 G/DL (ref 3.5–5)
ALP SERPL-CCNC: 103 U/L (ref 38–126)
ANION GAP SERPL CALC-SCNC: 10 MMOL/L (ref 5–19)
AST SERPL-CCNC: 24 U/L (ref 17–59)
BASOPHILS # BLD AUTO: 0 10^3/UL (ref 0–0.2)
BASOPHILS NFR BLD AUTO: 0.3 % (ref 0–2)
BILIRUB DIRECT SERPL-MCNC: 0.4 MG/DL (ref 0–0.4)
BILIRUB SERPL-MCNC: 0.4 MG/DL (ref 0.2–1.3)
BUN SERPL-MCNC: 20 MG/DL (ref 7–20)
CALCIUM: 10 MG/DL (ref 8.4–10.2)
CHLORIDE SERPL-SCNC: 103 MMOL/L (ref 98–107)
CO2 SERPL-SCNC: 27 MMOL/L (ref 22–30)
EOSINOPHIL # BLD AUTO: 0 10^3/UL (ref 0–0.6)
EOSINOPHIL NFR BLD AUTO: 0.5 % (ref 0–6)
ERYTHROCYTE [DISTWIDTH] IN BLOOD BY AUTOMATED COUNT: 15 % (ref 11.5–14)
GLUCOSE SERPL-MCNC: 117 MG/DL (ref 75–110)
HCT VFR BLD CALC: 39.9 % (ref 37.9–51)
HGB BLD-MCNC: 13.1 G/DL (ref 13.5–17)
LYMPHOCYTES # BLD AUTO: 1.4 10^3/UL (ref 0.5–4.7)
LYMPHOCYTES NFR BLD AUTO: 19.4 % (ref 13–45)
MCH RBC QN AUTO: 29.4 PG (ref 27–33.4)
MCHC RBC AUTO-ENTMCNC: 33 G/DL (ref 32–36)
MCV RBC AUTO: 89 FL (ref 80–97)
MONOCYTES # BLD AUTO: 0.4 10^3/UL (ref 0.1–1.4)
MONOCYTES NFR BLD AUTO: 5.7 % (ref 3–13)
NEUTROPHILS # BLD AUTO: 5.3 10^3/UL (ref 1.7–8.2)
NEUTS SEG NFR BLD AUTO: 74.1 % (ref 42–78)
PLATELET # BLD: 231 10^3/UL (ref 150–450)
POTASSIUM SERPL-SCNC: 4.6 MMOL/L (ref 3.6–5)
PROT SERPL-MCNC: 7.3 G/DL (ref 6.3–8.2)
RBC # BLD AUTO: 4.48 10^6/UL (ref 4.35–5.55)
TOTAL CELLS COUNTED % (AUTO): 100 %
WBC # BLD AUTO: 7.1 10^3/UL (ref 4–10.5)

## 2019-09-07 PROCEDURE — 80053 COMPREHEN METABOLIC PANEL: CPT

## 2019-09-07 PROCEDURE — 85025 COMPLETE CBC W/AUTO DIFF WBC: CPT

## 2019-09-07 PROCEDURE — 96374 THER/PROPH/DIAG INJ IV PUSH: CPT

## 2019-09-07 PROCEDURE — 96361 HYDRATE IV INFUSION ADD-ON: CPT

## 2019-09-07 PROCEDURE — 36415 COLL VENOUS BLD VENIPUNCTURE: CPT

## 2019-09-07 PROCEDURE — 70450 CT HEAD/BRAIN W/O DYE: CPT

## 2019-09-07 PROCEDURE — 99284 EMERGENCY DEPT VISIT MOD MDM: CPT

## 2019-09-07 NOTE — ER DOCUMENT REPORT
ED General





- General


Chief Complaint: Headache


Stated Complaint: HEADACHE


Time Seen by Provider: 09/07/19 11:20


Primary Care Provider: 


BRUNO ARRIETA DO [Primary Care Provider] - Follow up in 3-5 days


TRAVEL OUTSIDE OF THE [place] IN LAST 30 DAYS: No





- HPI


Notes: 





59-year-old male to the emergency department with complaints of a headache that 

started this morning at 3 AM.  Patient states that he awoke with this headache. 

He states that he feels like it wraps around his head and is dull and pounding. 

He denies any nausea or vomiting.  He denies any photophobia.  He states that he

has had headaches before.  He denies thunderclap headache.  Patient states that 

he is on chronic pain medicine for his bad lower back and legs provided by his 

primary care Dr. Arrieta.  States he takes 30 mg of oxycodone twice a day true 

Fredis.  He took it shortly after arrival here.  He denies any fevers, chills, 

neck pain, passing out, arm weakness.





- Related Data


Allergies/Adverse Reactions: 


                                        





ketorolac tromethamine [From Toradol] Allergy (Unknown, Verified 08/04/19 13:49)


   


NSAIDS (Non-Steroidal Anti-Inflamma [Nsaids] Allergy (Unknown, Verified 08/04/19

 13:49)


   


ketorolac [From Toradol] Adverse Reaction (Verified 08/04/19 13:49)


   











Past Medical History





- General


Information source: Patient





- Social History


Smoking Status: Current Every Day Smoker


Frequency of alcohol use: None


Drug Abuse: None


Family History: Reviewed & Not Pertinent


Patient has suicidal ideation: No


Patient has homicidal ideation: No





- Past Medical History


Cardiac Medical History: Reports: Hx Hypertension


   Denies: Hx Coronary Artery Disease, Hx Heart Attack


Pulmonary Medical History: Reports: Hx COPD, Hx Pneumonia - HX. OF


   Denies: Hx Asthma, Hx Bronchitis


Neurological Medical History: Denies: Hx Cerebrovascular Accident, Hx Seizures


Renal/ Medical History: Reports: Hx Kidney Stones.  Denies: Hx Peritoneal 

Dialysis


Musculoskeletal Medical History: Reports Hx Arthritis - NEUROPATHIC NEUROPATHY


Past Surgical History: Reports: Hx Orthopedic Surgery - stem cells implanted in 

spine





- Immunizations


Hx Diphtheria, Pertussis, Tetanus Vaccination: Yes





Review of Systems





- Review of Systems


Constitutional: denies: Chills, Fever


EENT: Sinus pressure.  denies: Ear pain, Throat pain


Cardiovascular: denies: Chest pain, Palpitations, Heart racing, Orthopnea, 

Dyspnea


Respiratory: denies: Cough, Short of breath


Gastrointestinal: denies: Abdominal pain, Diarrhea, Nausea, Vomiting


Genitourinary: denies: Burning, Dysuria, Frequency, Flank pain, Hematuria, 

Incontinence


Musculoskeletal: denies: Back pain


Skin: No symptoms reported


Neurological/Psychological: Headaches.  denies: Numbness, Tingling


-: Yes All other systems reviewed and negative





Physical Exam





- Vital signs


Vitals: 


                                        











Resp BP Pulse Ox


 


 7 L  129/84 H  94 


 


 09/07/19 08:41  09/07/19 08:41  09/07/19 08:41











Interpretation: Normal


Notes: 





Initial vital signs in chart erroneous.  Patient's vital signs are blood 

pressure 119/76 O2 sat of 94% on room air respiratory rate of 22 he is afebrile





- General


General appearance: Alert, Other - Chronically ill-appearing


In distress: None





- HEENT


Head: Normocephalic, Atraumatic


Eyes: Normal


Pupils: PERRL


Ears: Normal


External canal: Normal


Tympanic membrane: Normal


Sinus: Normal


Nasal: Normal


Mouth/Lips: Normal


Mucous membranes: Dry


Pharynx: Normal.  No: Erythema, Exudate


Neck: Normal, Supple.  No: Lymphadenopathy, Meningismus





- Respiratory


Respiratory status: No respiratory distress


Chest status: Nontender


Breath sounds: Normal


Chest palpation: Normal





- Cardiovascular


Rhythm: Regular


Heart sounds: Normal auscultation


Murmur: No





- Abdominal


Inspection: Normal


Distension: No distension


Bowel sounds: Normal


Tenderness: Nontender


Organomegaly: No organomegaly





- Neurological


Neuro grossly intact: Yes


Cognition: Normal


Orientation: AAOx4 - Patient is oriented to person place time and events however

he is just taken his pain medicine and is drowsy.  He easily awakens and follows

commands but sometimes has to be redirected.


[place] Coma Scale Eye Opening: Spontaneous


[place] Coma Scale Verbal: Oriented


[place] Coma Scale Motor: Obeys Commands


[place] Coma Scale Total: 15


Speech: Normal


Cranial nerves: Normal.  No: Facial palsy, Forehead sparing, Gaze palsy, Sensory

deficit, Tongue deviation


Cerebellar coordination: Other - Bilateral lower leg ataxia for which patient 

uses wheelchairhe states this is his baseline and it has not gotten worse since

his headaches started


Motor strength normal: LUE, RUE, LLE, RLE


Additional motor exam normals: Equal .  No: Pronator drift


Sensory: Normal





- Psychological


Associated symptoms: Normal affect, Normal mood





- Skin


Skin Temperature: Warm


Skin Moisture: Dry


Skin Color: Normal





Course





- Re-evaluation


Re-evalutation: 





09/07/19 13:53


Patient is doing much better after Reglan.  He is also gotten a bag of liters 

because on exam he had dry mucosal membranes.  Noted labs which does show a 

little increase in his chronic kidney disease is most likely dehydration in 

nature.  He does not have an elevated white count and he is much more alert 

since he took his chronic pain medicines.  He states that his headache is much 

improved.  He would like to go home.  Noted sinusitis on head CT.  Patient is 

currently on antibiotic from his primary care and has recently been on Bactrim 

and Levaquin so we will not give another antibiotic.  We will send home with a 

short steroid Dosepak.  Have encouraged him to see Dr. Pérez without fail on 

Monday or Tuesday.  He agrees with the plan





- Vital Signs


Vital signs: 


                                        











Temp Pulse Resp BP Pulse Ox


 


 97.9 F   104 H  20   119/76   93 


 


 09/07/19 08:48  09/07/19 08:48  09/07/19 13:14  09/07/19 13:14  09/07/19 13:14














- Laboratory


Result Diagrams: 


                                 09/07/19 08:17





                                 09/07/19 08:17


Laboratory results interpreted by me: 


                                        











  09/07/19 09/07/19





  08:17 08:17


 


Hgb  13.1 L 


 


RDW  15.0 H 


 


Creatinine   1.75 H


 


Est GFR ( Amer)   49 L


 


Est GFR (MDRD) Non-Af   40 L


 


Glucose   117 H














- Diagnostic Test


Radiology reviewed: Image reviewed, Reports reviewed





Discharge





- Discharge


Clinical Impression: 


 Chronic kidney disease, Dehydration





Headache


Qualifiers:


 Headache type: unspecified Headache chronicity pattern: acute headache 

Intractability: not intractable Qualified Code(s): R51 - Headache





Sinusitis


Qualifiers:


 Sinusitis location: sphenoidal 





Condition: Stable


Disposition: HOME, SELF-CARE


Instructions:  Headache ([facility])


Additional Instructions: 


PUSH FLUIDS.  FOLLOW UP WITH DR. ARRIETA ON MONDAY WITHOUT FAIL.  TAKE MEDICINES

AS PRESCRIBED.   RETURN IF WORSENING SYMPTOMS. 


Prescriptions: 


Methylprednisolone [Medrol Dosepack (4 mg/Tab) 21 Tab/Dosepak] 4 mg PO ASDIR PRN

#21 tab.ds.pk


 PRN Reason: 


Referrals: 


BRUNO ARRIETA DO [Primary Care Provider] - Follow up in 3-5 days

## 2019-09-07 NOTE — RADIOLOGY REPORT (SQ)
EXAM DESCRIPTION:  CT HEAD WITHOUT



COMPLETED DATE/TIME:  9/7/2019 12:19 pm



REASON FOR STUDY:  headache



COMPARISON:  7/29/2017



TECHNIQUE:  Axial images acquired through the brain without intravenous contrast.  Images reviewed wi
th bone, brain and subdural windows.  Additional sagittal and coronal reconstructions were generated.
 Images stored on PACS.

All CT scanners at this facility use dose modulation, iterative reconstruction, and/or weight based d
osing when appropriate to reduce radiation dose to as low as reasonably achievable (ALARA).

[facility]: Dose Right  [facility]: CareDose    [facility]: Dose Right    [facility]: Teradose 4D    [facility]: Smart Deck Works.co



RADIATION DOSE:  CT Rad equipment meets quality standard of care and radiation dose reduction techniq
ues were employed. CTDIvol: 53.2 mGy. DLP: 991 mGy-cm. mGy.



LIMITATIONS:  None.



FINDINGS:  VENTRICLES: Normal size and contour.

CEREBRUM: No masses.  No hemorrhage.  No midline shift.  No evidence for acute infarction. Normal gra
y/white matter differentiation. No areas of low density in the white matter.

CEREBELLUM: No masses.  No hemorrhage.  No alteration of density.  No evidence for acute infarction.

EXTRAAXIAL SPACES: No fluid collections.  No masses.

ORBITS AND GLOBE: No intra- or extraconal masses.  Normal contour of globe without masses.

CALVARIUM: No fracture.

PARANASAL SINUSES: Mucosal thickening of the left sphenoid sinus.

SOFT TISSUES: No mass or hematoma.

OTHER: No other significant finding.



IMPRESSION:  1.  No acute intracranial pathology.  No noncontrast intracranial findings to explain he
adache.

2.  Mucosal thickening of the left sphenoid sinus.  Correlate for signs and symptoms of sinusitis.

EVIDENCE OF ACUTE STROKE: NO.



COMMENT:  Quality ID # [id number]: Final reports with documentation of one or more dose reduction techniques
 (e.g., Automated exposure control, adjustment of the mA and/or kV according to patient size, use of 
iterative reconstruction technique)



TECHNICAL DOCUMENTATION:  JOB ID:  [id number]

 2011 [employer]- All Rights Reserved



Reading location - IP/workstation name: [email or web address]

## 2020-01-16 ENCOUNTER — HOSPITAL ENCOUNTER (OUTPATIENT)
Dept: HOSPITAL 62 - OD | Age: 60
End: 2020-01-16
Attending: FAMILY MEDICINE
Payer: MEDICARE

## 2020-01-16 DIAGNOSIS — M25.561: Primary | ICD-10-CM

## 2020-01-16 DIAGNOSIS — M25.562: ICD-10-CM

## 2020-01-16 NOTE — RADIOLOGY REPORT (SQ)
EXAM DESCRIPTION:  KNEE LEFT 4 VIEWS



COMPLETED DATE/TIME:  1/16/2020 3:08 pm



REASON FOR STUDY:  BRAYAN KNEE PAIN M25.562  PAIN IN LEFT KNEE M25.561  PAIN IN RIGHT KNEE



COMPARISON:  2/19/2014.



NUMBER OF VIEWS:  Four views.



TECHNIQUE:  AP, lateral, and both oblique radiographic images acquired of the left knee.



LIMITATIONS:  None.



FINDINGS:  MINERALIZATION: Normal.

BONES: No acute fracture or dislocation. No worrisome bone lesions. No significant osteophytes.

JOINT: No effusion.  No chondrocalcinosis.

OTHER: No other significant finding.



IMPRESSION:  NEGATIVE STUDY OF THE LEFT KNEE. NO EXPLANATION FOR PAIN.



TECHNICAL DOCUMENTATION:  JOB ID:  [id number]

 2011 [employer]- All Rights Reserved



Reading location - IP/workstation name: [email or web address]

## 2020-01-16 NOTE — RADIOLOGY REPORT (SQ)
EXAM DESCRIPTION:  KNEE RIGHT 4 VIEWS



COMPLETED DATE/TIME:  1/16/2020 3:09 pm



REASON FOR STUDY:  BRAYAN KNEE PAIN M25.562  PAIN IN LEFT KNEE M25.561  PAIN IN RIGHT KNEE



COMPARISON:  None.



NUMBER OF VIEWS:  Four views.



TECHNIQUE:  AP, lateral, and both oblique radiographic images acquired of the right knee.



LIMITATIONS:  None.



FINDINGS:  MINERALIZATION: Normal.

BONES: No acute fracture or dislocation. No worrisome bone lesions. No significant osteophytes.

JOINT: No effusion.  No chondrocalcinosis.

OTHER: No other significant finding.



IMPRESSION:  NEGATIVE STUDY OF THE RIGHT KNEE. NO EXPLANATION FOR PAIN.



TECHNICAL DOCUMENTATION:  JOB ID:  [id number]

 2011 [employer]- All Rights Reserved



Reading location - IP/workstation name: [email or web address]

## 2020-09-10 ENCOUNTER — HOSPITAL ENCOUNTER (OUTPATIENT)
Dept: HOSPITAL 62 - RAD | Age: 60
End: 2020-09-10
Attending: FAMILY MEDICINE
Payer: MEDICARE

## 2020-09-10 DIAGNOSIS — M16.0: Primary | ICD-10-CM

## 2020-09-10 PROCEDURE — 64640 INJECTION TREATMENT OF NERVE: CPT

## 2020-09-10 NOTE — OPERATIVE REPORT
PROCEDURE: 


1.  bilateral articular branch of femoral nerve radiofrequency denervation


2.  bilateral articular branch of obturator nerve radiofrequency denervation





Preoperative Diagnosis: bilateral osteoarthritis Hip


Postoperative Diagnosis: bilateral  osteoarthritis Hip





DATE OF PROCEDURE: September 10, 2020


ANESTHESIA: Local anesthesia


COMPLICATIONS: none reported


PROCEDURE IN DETAIL: 


Hx/PE/meds/allergies/applicable labs reviewed. No changes and no 

contraindications were found. Full description of the procedure was provided 

including benefits as well as possible complications including transient 

increased pain, stomach irritation, mood alteration, transient weakness or 

parathesias as well as more serious nerve injury, bleeding, infection or 

allergic reaction. Informed consent was obtained and documented.


The patient was brought to the procedure room and placed on the exam table in a 

comfortable supine position. The place for the needle placement was obtained by 

manual palpation with radiographic confirmation. The sterile field was prepared 

and sterile drapes. Local anesthesia superficial and deep was provided by local 

infiltration of 6 ml 1 % lidocaine.


Using fluoroscopic guidance a 17g 150 mm radiofrequency needle with 4mm active 

tip was advanced to the anteromedial aspect of the extraarticular portion of the

hip joint where the articular branch of the femoral nerve traverses until a bony

endpoint is felt. Attempted aspiration yielded no blood. Motor testing was then 

performed with 2hz at 2 volts and no lower extremity motor stimulation was 

observed. 2 cc of .5 % marcaine was injected through the RF needle. A 

radiofrequency lesion of the articular branch of the femoral nerve was then 

performed at 80 degrees C for 2 min and 30 seconds. The needle was then 

withdrawn.


A second needle was placed and using fluoroscopic confirmation with ULTRASOUND 

guidance the needle was advanced to the incisura of the acetabulum where the 

articular branch of the obturator nerve traverses until a bony endpoint was met.

Attempted aspiration yielded no blood. Radiographs were made. Motor testing was 

then performed with 2hz at 2 volts and no lower extremity motor stimulation was 

observed. 2cc .5 % marcaine was injected through the RF needle. A radiofrequency

lesion of the articular branch of the obturator nerve was then performed at 80 

degrees C for 2 minutes and 30 seconds. The needle was then withdrawn. 


The patient tolerated the procedure well. After observation the patient was 

discharged with instructions and follow up. They were also provided contact info

rmation to call regarding any concerning symptoms or questions. 


IMPRESSION:


1. Successful radiofrequency ablations of the articular branches of the 

obturator and femoral nerves was performed on bilateral hips.


2. Follow up in 1-2 weeks to assess the efficacy of the procedure.


3. Estimated Blood Loss: 0


4. Disposition: home